# Patient Record
(demographics unavailable — no encounter records)

---

## 2024-10-14 NOTE — PHYSICAL EXAM
[TextEntry] : Constitutional: Patient is well nourished, well appearing and in no distress Pulmonary: No respiratory distress Neuro: Speech normal, alert and oriented No LE edema Psychiatric: Normal mood, normal insight/judgement, normal affect Repeat blood pressure is 145/90 I reviewed his emergency room records including the physician note, CBC, CMP and lower extremity ultrasound

## 2024-10-14 NOTE — HISTORY OF PRESENT ILLNESS
[FreeTextEntry1] : Patient is here for follow-up of diabetes, lower extremity edema [de-identified] : Patient visited the emergency room with bilateral lower extremity edema 1 week ago.  There is no evidence of DVT and he was released.  Since then he has been using elastic stockings and the edema has improved. In terms of his diabetes he has become more faithful in taking his regular insulin with his meals.  Looking at his freestyle enrique results for the last 7 days his fasting blood sugar is averaging about 150 and his average blood sugar looks to be around 200 which is improved from the week before.  Patient states that he tried taking Ozempic last week but it caused significant pain at the site of the injection and a bad taste in his mouth.  He says he will not be taking Ozempic again.  He stopped his metformin about 4 weeks ago because he read reports on it causing problems.  He is currently taking long-acting insulin 26 units in the morning and 26 units at night along with short acting insulin 5 units before each meal.

## 2024-10-14 NOTE — ASSESSMENT
[FreeTextEntry1] : . #Diabetes mellitus not controlled: Will increase his evening Basaglar to 28 units and continue 26 units in the morning.  Continue 5 units of short acting insulin with meals.  Patient has missed 2 endocrinology appointments but has one for the end of this month and I explained how important it is that he see the endocrinologist.  He apparently is intolerant to Ozempic and does not wish to take metformin.  I will obtain urinalysis and albumin to creatinine ratio  #Lower extremity edema: Improved with elastic stockings.  Most likely related to venous insufficiency  #Blood pressure elevation: Patient's blood pressure is elevated today however his previous blood pressures have been normal.  Will check his blood pressure again in 1 month.

## 2024-10-29 NOTE — DATA REVIEWED
[FreeTextEntry1] : 9/17/24 A1c 10.0%,   6/14/24 serum creatinine 0.78, BUN 16,  LDL 72, HDL 48, total-c 143, tg 133 TSH 1.63

## 2024-10-29 NOTE — HISTORY OF PRESENT ILLNESS
[FreeTextEntry1] : Bernardo Velarde is a 60 year old male who presents to establish care for Diabetes Mellitus Type 2. Referred by: Dr. Salomón Rooney  Previously seen by Dr. Smith in April 2024   PMHx: Obesity, DM, HTN, HLD, venous insufficiency, Vitamin D deficiency, asthma PSHx: left foot bone pin/screw, abdominal hernia repair, rotator cuff surgery, cervical spine surgery, brain aneurysm repair FMHx: DM, HTN (mother, father), prostate cancer (father) NKDA   Diagnosed with Diabetes in [id number] -- was living in [place], was hospitalized for abdominal surgery and during procedure was told he had DM Currently endorses symptoms of hyperglycemia including polyuria, polydipsia, blurry vision Denies polyphagia  Describes making many dietary changes recently but glucose still above goal. He is working towards improved glucose control to have spinal surgery w/Dr. Porter Has cut out white rice, bread, fried foods, pork, beef, potato chips, cookies Only proteins are chicken, fish Says he "doesn't know what moderation means" so now he doesn't buy things he knows he shouldn't eat This Am - , had a little container of milk went to 220   POCT A1c in office: 9.3% FS in office: 206 -- ~2hrs after ~8oz milk at 8:30AM   Current Regimen: -- Basaglar 26 units in AM, 28 units in PM -- Humalog 5 units with meals  -- Jardiance 25mg daily  Previous regimens: -- Ozempic (had LLQ abdominal pain/reflux symptoms which have persisted since d/c of medication)  -- Metformin (no longer wanted to take d/t his concerns about side effects)   Bernardo tests blood glucose using Freestyle [employer] 3, reviewed recent data on his phone tanja. Date of report download: 10/16/2024 - 10/29/2024 % time with CGM in use: 96%   Time high: 56% (time BG >251: 14%) Time in range: 44% Time low: 0% (time BG <54: 0%)   GMI: 7.9% Average glucose: 193 mg/dL   CGM analysis reveals: Glucose generally increasing over the course of the day, indicating incomplete response to prandial dose of Humalog 5 units w/meals. Obvious decline in glucose level while overnight fasting.   - Diet: Drinks a lot of milk, up to 3-4 gallons every 2 weeks Skips breakfast or has an ensure Lunch - usually veggie wrap or coconut brown rice with tunafish Dinner - may be similar to lunch  Sometimes has diet soda - Exercise: Minimal because of the back pain   Complications + neuropathy, retinopathy: No known albuminuria Denies CVD events - MI/stroke   Ophthalmologist: +DR (has laser eye surgery tomorrow for swelling behind eye and floaters) Podiatrist: Saw a few years ago   Social Hx: No alcohol Denies nicotine Uses medical marijuana, no other drug use work -- on disability now s/p neck surgery Lives alone with dog, Pete Children - none  Current medications: Jardiance, Humalog, Basaglar, atorvastatin (he does not recall names of other medications)

## 2024-10-29 NOTE — REVIEW OF SYSTEMS
[Recent Weight Gain (___ Lbs)] : recent weight gain: [unfilled] lbs [Blurred Vision] : blurred vision [Abdominal Pain] : abdominal pain [Polyuria] : polyuria [Nocturia] : nocturia [Back Pain] : back pain [Difficulty Walking] : difficulty walking [Polydipsia] : polydipsia [Negative] : Psychiatric [FreeTextEntry3] : Floaters [FreeTextEntry7] : + reflux

## 2024-10-29 NOTE — ASSESSMENT
[FreeTextEntry1] : Type 2 DM, not well controlled, complications including retinopathy (planned laser eye surgery tomorrow), neuropathy  Current regimen: Basaglar 26 units in AM, 28 units in PM, Jardiance 25mg daily, Humalog 5 units with meals  Home blood glucose monitoring reveals glucose generally increasing over the course of the day, indicating incomplete response to prandial dose of Humalog 5 units w/meals. Obvious decline in glucose level while overnight fasting. He would benefit from GLP-1 as he has increased prandial needs, however he still has some residual symptoms after stopping Ozempic. We discussed starting Mounjaro, may initate at next visit.   Goal A1c <7%, A1c 9.3% Goal BP <130/80, BP today 108/67 Goal LDL <100mg/dL, recent LDL 72 (June 2024), on Atorvastatin 40mg daily  Plan -- Decrease Basaglar to 24 units BID -- Increase Humalog to 7 units with meals -- Continue Jardiance 25mg daily -- Continue CGM monitoring with [employer] 3 - will send Heidi invitation to email  -- Call office with significant hypo- or hyperglycemia with above recommendations -- Increase physical activity as tolerated, even if it means range of motion exercises -- Continue dietary modifications and healthy diet, consider decreasing milk intake by about half. -- Follow up in 6 weeks

## 2024-10-29 NOTE — PHYSICAL EXAM
[Alert] : alert [No Acute Distress] : no acute distress [EOMI] : extra ocular movement intact [Normal Hearing] : hearing was normal [Thyroid Not Enlarged] : the thyroid was not enlarged [No Thyroid Nodules] : no palpable thyroid nodules [No Respiratory Distress] : no respiratory distress [No Accessory Muscle Use] : no accessory muscle use [Normal Rate and Effort] : normal respiratory rate and effort [Clear to Auscultation] : lungs were clear to auscultation bilaterally [Normal S1, S2] : normal S1 and S2 [Normal Rate] : heart rate was normal [No Edema] : no peripheral edema [Pedal Pulses Normal] : the pedal pulses are present [Normal Bowel Sounds] : normal bowel sounds [Not Tender] : non-tender [Not Distended] : not distended [Soft] : abdomen soft [Kyphosis] : no kyphosis present [No Stigmata of Cushings Syndrome] : no stigmata of Cushings Syndrome [Abdominal Striae] : no abdominal striae [Acne] : no acne [Oriented x3] : oriented to person, place, and time [Normal Insight/Judgement] : insight and judgment were intact [de-identified] : Some bruising on abdomen, mild lipodystrophy in injection sites (pt states he's recently started rotating) [de-identified] : Altered gait, uses cane

## 2024-11-18 NOTE — HISTORY OF PRESENT ILLNESS
[FreeTextEntry1] : Patient is here for follow-up of diabetes, hypertension, hyperlipidemia [de-identified] : Patient is seeing an endocrinologist who adjusted his insulin regimen.  Unfortunately patient does not have the sensor for his continuous glucose monitor so we have no readings today.  Patient also is uncertain about the other medications that he is taking but he knows he is no longer taking metformin.  He believes he is taking Jardiance.  I asked him to bring his medications in at next visit so we can review them all. Patient states he was in the emergency room recently because he had difficulty walking.  He was treated and released with no specific therapy.  Patient states he seen a pain specialist in the past but is not willing to see 1 in the future. Patient refuses all vaccines and colorectal screening.

## 2024-11-18 NOTE — ASSESSMENT
[FreeTextEntry1] : . #Insulin requiring diabetes: Will continue regimen recommended by endocrinologist.  Patient is strongly advised to keep his next appointment  #Hyperlipidemia: Patient is unsure of the medications that he is taking at home.  Continue atorvastatin 40 mg daily  #Back pain: Patient unwilling to see pain specialist at this time.  I have advised him of the potential benefits but he still refuses  #Health maintenance: Patient is refusing all vaccinations and colorectal screening.  He had a normal PSA in September 2024.  #Hypertension: on no meds, at goal today and even lower at recent physician visits. Will not prescribe meds.  Will see patient in 2 months and he is advised to bring all his medications with him at that time.

## 2024-11-18 NOTE — PHYSICAL EXAM
[TextEntry] : Constitutional: Patient is well nourished, well appearing and in no distress Pulmonary: No respiratory distress Neuro: Speech normal, alert and oriented Psychiatric: Normal mood, normal insight/judgement, normal affect  Blood pressure is elevated upon arrival.  Patient believes that is because he just used a vaping device just prior to arrival.  Repeat blood pressure is a bit better.

## 2025-01-02 NOTE — ASSESSMENT
[FreeTextEntry1] : Type 2 DM, not well controlled, complications including retinopathy, neuropathy  Current regimen: Basaglar 30 units in AM, 30 units in PM, Jardiance 25mg daily, Humalog 10 units with meals  Home blood glucose monitoring reveals spike around 1AM (may have iced tea or milk at this time) followed by generally declining glucose until about 8AM at which point glucose tends to climb throughout day, reflecting extended/accumulating postprandial excursions. As Bernardo often has difficulty remembering to take Humalog w/meals (and notes that he only takes Humalog ~1 or 2x/day, we discussed initiating GLP to help with postprandial excursions. Notes he had some abdominal discomfort with Ozempic and has some LLQ pain (which he acknowledges may be r/t back pain radiating or possible hernia), but is open to a trial of Mounjaro.    Goal A1c <7%, A1c 9.2% Goal BP <130/80, BP today 153/88, taking metoprolol daily Goal LDL <100mg/dL, recent LDL 72 (June 2024), on Atorvastatin 40mg daily  Plan -- Start Mounjaro 2.5mg weekly -- Decrease Basaglar to 28 units BID -- Continue Humalog 10 units with meals but if 2hr PPBS is 120 or less after starting Mounjaro, decrease Humalog dose to 8 units with meals -- Continue Jardiance 25mg daily -- Continue CGM monitoring with [email or web address] Sloane 3, bring cell phone to next appt -- Call office with significant hypo- or hyperglycemia with above recommendations -- Follow up with PCP regarding GI concerns -- Increase physical activity as tolerated -- podiatry referral -- Follow up in 8 weeks with me, in 5 months with MD

## 2025-01-02 NOTE — REVIEW OF SYSTEMS
[Recent Weight Gain (___ Lbs)] : recent weight gain: [unfilled] lbs [Abdominal Pain] : abdominal pain [Back Pain] : back pain [Negative] : Endocrine [Nausea] : no nausea [Constipation] : no constipation [Vomiting] : no vomiting [FreeTextEntry7] : reports LLQ abdominal pain radiating to groin

## 2025-01-02 NOTE — ASSESSMENT
[FreeTextEntry1] : Type 2 DM, not well controlled, complications including retinopathy, neuropathy  Current regimen: Basaglar 30 units in AM, 30 units in PM, Jardiance 25mg daily, Humalog 10 units with meals  Home blood glucose monitoring reveals spike around 1AM (may have iced tea or milk at this time) followed by generally declining glucose until about 8AM at which point glucose tends to climb throughout day, reflecting extended/accumulating postprandial excursions. As Bernardo often has difficulty remembering to take Humalog w/meals (and notes that he only takes Humalog ~1 or 2x/day, we discussed initiating GLP to help with postprandial excursions. Notes he had some abdominal discomfort with Ozempic and has some LLQ pain (which he acknowledges may be r/t back pain radiating or possible hernia), but is open to a trial of Mounjaro.    Goal A1c <7%, A1c 9.2% Goal BP <130/80, BP today 153/88, taking metoprolol daily Goal LDL <100mg/dL, recent LDL 72 (June 2024), on Atorvastatin 40mg daily  Plan -- Start Mounjaro 2.5mg weekly -- Decrease Basaglar to 28 units BID -- Continue Humalog 10 units with meals but if 2hr PPBS is 120 or less after starting Mounjaro, decrease Humalog dose to 8 units with meals -- Continue Jardiance 25mg daily -- Continue CGM monitoring with [employer] [facility] 3, bring cell phone to next appt -- Call office with significant hypo- or hyperglycemia with above recommendations -- Follow up with PCP regarding GI concerns -- Increase physical activity as tolerated -- podiatry referral -- Follow up in 8 weeks with me, in 5 months with MD

## 2025-01-02 NOTE — PHYSICAL EXAM
[Alert] : alert [No Acute Distress] : no acute distress [EOMI] : extra ocular movement intact [Normal Hearing] : hearing was normal [No Respiratory Distress] : no respiratory distress [No Accessory Muscle Use] : no accessory muscle use [Normal Rate and Effort] : normal respiratory rate and effort [Clear to Auscultation] : lungs were clear to auscultation bilaterally [Normal S1, S2] : normal S1 and S2 [Normal Rate] : heart rate was normal [No Edema] : no peripheral edema [Pedal Pulses Normal] : the pedal pulses are present [Not Distended] : not distended [Spine Straight] : spine straight [No Stigmata of Cushings Syndrome] : no stigmata of Cushings Syndrome [Normal Gait] : normal gait [No Joint Swelling] : no joint swelling seen [Oriented x3] : oriented to person, place, and time [Normal Insight/Judgement] : insight and judgment were intact [Kyphosis] : no kyphosis present [Abdominal Striae] : no abdominal striae [de-identified] : mild lipodystrophy on right side of abdomen. Otherwise abdomen soft, not distended, though pt notes mild discomfort with palpation of LLQ

## 2025-01-02 NOTE — HISTORY OF PRESENT ILLNESS
[FreeTextEntry1] : Bernardo Velarde is a 60 year old male who presents to establish care for Diabetes Mellitus Type 2. DM diagnosed around 2005 Complications: neuropathy, retinopathy  PMHx: Obesity, DM, HTN, HLD, venous insufficiency, Vitamin D deficiency, asthma PSHx: left foot bone pin/screw, abdominal hernia repair, rotator cuff surgery, cervical spine surgery, brain aneurysm repair FMHx: DM, HTN (mother, father), prostate cancer (father) NKDA  At initial visit, had polyuria, polydipsia -- this continues Has been working towards improved glucose control to have spinal surgery w/Dr. Porter -- still has back pain Was drinking a lot of milk -- has not really decreased intake of milk but now drinking less soda Thinks his DM has been less well controlled lately d/t everything he ate in the holidays -- gained a little weight Had swelling of RLE -- seen in ER at [facility], had US and was told no blood clot. Reports his gabapentin dose was increased and swelling has improved.  Sometimes forgets to take the Humalog before eating because he gets cranky and hungry, takes it after   POCT A1c in office: 9.2% from 9.3% in late October FS in office: 113 is 3hrs PPBS -- ate turkey colmenares and eggs on a roll at 8AM and took Humalog 10 units   Current Regimen: -- Basaglar 30 units in AM, 30 units in PM -- Humalog 10 units with meals  -- Jardiance 25mg daily  Previous regimens: -- Ozempic (had LLQ abdominal pain/reflux symptoms which have persisted since d/c of medication)  -- Metformin (no longer wanted to take d/t his concerns about side effects)   Bernardo tests blood glucose using Freestyle [place] 3, reviewed recent data on his phone tanja. Date of report download: 12/10/24 % time with CGM in use: 78%   Time high: 74% (time BG >251: 39%) Time in range: 26% Time low: 0% (time BG <54: 0%)   GMI: 8.9% Average glucose: 233mg/dL   CGM analysis reveals: spike around 1AM (may have iced tea or milk at this time) followed by generally declining glucose until about 8AM at which point glucose tends to climb throughout day, reflecting extended/accumulating postprandial excursions   - Diet: Drinks a lot of milk, up to 3-4 gallons every 2 weeks Skips breakfast or has an ensure Lunch - usually veggie wrap or coconut brown rice with tunafish Dinner - may be similar to lunch  Sometimes has diet soda - Exercise: Minimal because of the back pain   Ophthalmologist: Nov 2024 -- has , receiving laser treatments  Podiatrist: Saw a few years ago   Social Hx: Uses medical marijuana, no other drug use work -- on disability now s/p neck surgery Lives alone with Pete obrien  Current medications: Jardiance, Humalog, Basaglar, atorvastatin (he does not recall names of other medications)

## 2025-01-02 NOTE — HISTORY OF PRESENT ILLNESS
[FreeTextEntry1] : Bernardo Velarde is a 60 year old male who presents to establish care for Diabetes Mellitus Type 2. DM diagnosed around 2005 Complications: neuropathy, retinopathy  PMHx: Obesity, DM, HTN, HLD, venous insufficiency, Vitamin D deficiency, asthma PSHx: left foot bone pin/screw, abdominal hernia repair, rotator cuff surgery, cervical spine surgery, brain aneurysm repair FMHx: DM, HTN (mother, father), prostate cancer (father) NKDA  At initial visit, had polyuria, polydipsia -- this continues Has been working towards improved glucose control to have spinal surgery w/Dr. Porter -- still has back pain Was drinking a lot of milk -- has not really decreased intake of milk but now drinking less soda Thinks his DM has been less well controlled lately d/t everything he ate in the holidays -- gained a little weight Had swelling of RLE -- seen in ER at [facility], had US and was told no blood clot. Reports his gabapentin dose was increased and swelling has improved.  Sometimes forgets to take the Humalog before eating because he gets cranky and hungry, takes it after   POCT A1c in office: 9.2% from 9.3% in late October FS in office: 113 is 3hrs PPBS -- ate turkey colmenares and eggs on a roll at 8AM and took Humalog 10 units   Current Regimen: -- Basaglar 30 units in AM, 30 units in PM -- Humalog 10 units with meals  -- Jardiance 25mg daily  Previous regimens: -- Ozempic (had LLQ abdominal pain/reflux symptoms which have persisted since d/c of medication)  -- Metformin (no longer wanted to take d/t his concerns about side effects)   Bernardo tests blood glucose using Freestyle [place] 3, reviewed recent data on his phone tanja. Date of report download: 12/10/24 % time with CGM in use: 78%   Time high: 74% (time BG >251: 39%) Time in range: 26% Time low: 0% (time BG <54: 0%)   GMI: 8.9% Average glucose: 233mg/dL   CGM analysis reveals: spike around 1AM (may have iced tea or milk at this time) followed by generally declining glucose until about 8AM at which point glucose tends to climb throughout day, reflecting extended/accumulating postprandial excursions   - Diet: Drinks a lot of milk, up to 3-4 gallons every 2 weeks Skips breakfast or has an ensure Lunch - usually veggie wrap or coconut brown rice with tunafish Dinner - may be similar to lunch  Sometimes has diet soda - Exercise: Minimal because of the back pain   Ophthalmologist: Nov 2024 -- has , receiving laser treatments  Podiatrist: Saw a few years ago   Social Hx: Uses medical marijuana, no other drug use work -- on disability now s/p neck surgery Lives alone with Pete obrien  Current medications: Jardiance, Humalog, Basaglar, atorvastatin (he does not recall names of other medications)

## 2025-01-02 NOTE — PHYSICAL EXAM
[Alert] : alert [No Acute Distress] : no acute distress [EOMI] : extra ocular movement intact [Normal Hearing] : hearing was normal [No Respiratory Distress] : no respiratory distress [No Accessory Muscle Use] : no accessory muscle use [Normal Rate and Effort] : normal respiratory rate and effort [Clear to Auscultation] : lungs were clear to auscultation bilaterally [Normal S1, S2] : normal S1 and S2 [Normal Rate] : heart rate was normal [No Edema] : no peripheral edema [Pedal Pulses Normal] : the pedal pulses are present [Not Distended] : not distended [Spine Straight] : spine straight [No Stigmata of Cushings Syndrome] : no stigmata of Cushings Syndrome [Normal Gait] : normal gait [No Joint Swelling] : no joint swelling seen [Oriented x3] : oriented to person, place, and time [Normal Insight/Judgement] : insight and judgment were intact [Kyphosis] : no kyphosis present [Abdominal Striae] : no abdominal striae [de-identified] : mild lipodystrophy on right side of abdomen. Otherwise abdomen soft, not distended, though pt notes mild discomfort with palpation of LLQ

## 2025-01-06 NOTE — HISTORY OF PRESENT ILLNESS
[FreeTextEntry1] : Patient is here for follow-up of multiple medical conditions. [de-identified] : Patient states he was recently admitted to the hospital at [facility] for persistent lower extremity discomfort.  He states that his gabapentin was increased to 400 mg 3 times daily. He has seen the endocrinologist to began Mounjaro and adjusted his long-acting insulin to 28 units twice a day and short acting insulin 10 units AC.  He has not yet started the Mounjaro. His blood pressure had been noted to be elevated at several recent visits.  Other discharge summary from the hospital, it appears the patient was prescribed lisinopril 10 mg daily but he is not certain if he is taking it.  In fact, patient is uncertain as to what medications he is taking. Patient shared his freestyle enrique tanja information which shows his blood sugars run between 180 and 250 consistently. Patient states he continues to have back pain and is frustrated that he cannot have surgery until his diabetes is under control.  He is requesting a TENS unit

## 2025-01-06 NOTE — PHYSICAL EXAM
[Soft] : abdomen soft [Non Tender] : non-tender [de-identified] : There is no evidence of a ventral or inguinal hernia

## 2025-01-06 NOTE — DATA REVIEWED
[FreeTextEntry1] : I reviewed the discharge summary from [facility], endocrinology notes and hemoglobin A1c obtained by the endocrinologist.

## 2025-01-06 NOTE — ASSESSMENT
[FreeTextEntry1] : . #Diabetes out-of-control: This no doubt accounts for patient's discomfort in his legs.  Patient is encouraged to begin the Mounjaro and stay on the same regimen prescribed by the endocrinologist  #Hypertension: Systolic blood pressure is 139 in the office today.  It is unclear if the patient is on lisinopril.  I will have the nurse call the patient while he is at home to do a complete medication reconciliation and we can adjust the medications at that time.  #Poor health literacy: See above  #Chronic back pain: Patient is requesting a TENS unit.  Will refer him to physiatry.  F/U 4-6 weeks

## 2025-02-03 NOTE — PHYSICAL EXAM
[TextEntry] : Constitutional: Patient is well nourished, well appearing and in no distress Pulmonary: No respiratory distress Neuro: Speech normal, alert and oriented Psychiatric: Normal mood, normal insight/judgement, normal affect Examination of the right foot reveals a very tender area between the first and second metatarsals on the plantar surface of his foot.  There is no skin breakdown or ulcers.

## 2025-02-03 NOTE — HISTORY OF PRESENT ILLNESS
[FreeTextEntry1] : Patient is here to follow-up for elevated blood pressure and diabetes [de-identified] : Patient has been seen by his endocrinologist and Mounjaro 2.5 mg weekly has been prescribed.  Patient has been taking it without difficulty except he missed his last dose.  I reviewed the blood sugars on his freestyle enrique and they seem to average between 150 and 250 without much improvement over the last 3 months.  Patient states he sometimes forgets to take his insulin as well. Patient's main complaint is pain in his right foot.

## 2025-03-06 NOTE — ASSESSMENT
[FreeTextEntry1] : Type 2 DM, not well controlled, complications including retinopathy, neuropathy  Current regimen: Basaglar 28 units BID, Jardiance 25mg daily, Humalog 10 units with meals, Mounjaro 2.5mg weekly  Home blood glucose monitoring reveals spike around 1AM (may have iced tea or milk at this time) followed by generally declining glucose until about 8AM at which point glucose tends to climb throughout day, reflecting extended/accumulating postprandial excursions. As Bernardo often has difficulty remembering to take Humalog w/meals (and notes that he only takes Humalog ~1 or 2x/day, we discussed initiating GLP to help with postprandial excursions. Notes he had some abdominal discomfort with Ozempic and has some LLQ pain (which he acknowledges may be r/t back pain radiating or possible hernia), but is open to a trial of Mounjaro.    Goal A1c <7%, A1c ** Goal BP <130/80, BP today *** taking metoprolol daily Goal LDL <100mg/dL, recent LDL 72 (June 2024), on Atorvastatin 40mg daily  Plan --  Mounjaro 2.5mg weekly -- Basaglar to 28 units BID -- Continue Humalog 10 units with meals but if 2hr PPBS is 120 or less after starting Mounjaro, decrease Humalog dose to 8 units with meals -- Continue Jardiance 25mg daily -- Continue CGM monitoring with Freestyle Sloane 3 -- indicated for MDI insulin use -- Increase physical activity as tolerated -- Follow up in 3 months with Dr. Smith as planned

## 2025-03-06 NOTE — HISTORY OF PRESENT ILLNESS
[FreeTextEntry1] : Bernardo Velarde is a 60 year old male who presents to establish care for Diabetes Mellitus Type 2. DM diagnosed around 2005 Complications: neuropathy, retinopathy  PMHx: Obesity, DM, HTN, HLD, venous insufficiency, Vitamin D deficiency, asthma PSHx: left foot bone pin/screw, abdominal hernia repair, rotator cuff surgery, cervical spine surgery, brain aneurysm repair FMHx: DM, HTN (mother, father), prostate cancer (father) NKDA  Tolerating mounjaro? polyuria, polydipsia? Has been working towards improved glucose control to have spinal surgery w/Dr. Porter -- still has back pain Was drinking a lot of milk  Had decreased soda intake   Taking Humalog before meal?  POCT A1c in office:  FS in office:    Current Regimen: -- Mounjaro 2.5mg weekly -- Basaglar 28 units in AM, 28 units in PM -- Humalog 10 units with meals  -- Jardiance 25mg daily   Bernardo tests blood glucose using Freestyle Slo[place] 3, reviewed recent data on his phone tanja. Date of report download:  % time with CGM in use:    Time high:  (time BG >251: ) Time in range: Time low: 0% (time BG <54: 0%)   GMI:  Average glucose:    CGM analysis reveals:   - Diet: Drinks a lot of milk, up to 3-4 gallons every 2 weeks Skips breakfast or has an ensure Lunch - usually veggie wrap or coconut brown rice with tunafish Dinner - may be similar to lunch  Sometimes has diet soda - Exercise: Minimal because of the back pain   Ophthalmologist: Nov 2024 -- has , receiving laser treatments  Podiatrist: Saw a few years ago, referred at last visit   Social Hx: Uses medical marijuana, no other drug use work -- on disability now s/p neck surgery Lives alone with Pete obrien  Current medications: Jardiance, Humalog, Basaglar, atorvastatin, Mounjaro 2.5mg weekly (he does not recall names of other medications)

## 2025-03-06 NOTE — DATA REVIEWED
[FreeTextEntry1] : 9/17/24 A1c 10.0%,   6/14/24 serum creatinine 0.78, BUN 16,  LDL 72, HDL 48, total-c 143, tg 133 TSH 1.63 15

## 2025-04-07 NOTE — PHYSICAL EXAM
[de-identified] : There is a less than 1 cm nodule on the sole of the patient's foot just below the first MP joint.  Is nontender [TextEntry] : Constitutional: Patient is well nourished, well appearing and in no distress Pulmonary: No respiratory distress Neuro: Speech normal, alert and oriented Psychiatric: Normal mood, normal insight/judgement, normal affect

## 2025-04-07 NOTE — HISTORY OF PRESENT ILLNESS
[FreeTextEntry1] : Patient is here for follow-up of diabetes [de-identified] : Patient was started on Mounjaro 2.5 mg by the endocrinologist.  He is lost 10 pounds since.  Other than malodorous burps, patient has no other concerns with Mounjaro.  He shared his freestyle enrique results which only has 2 days of results.  He was only at goal 22% of the time.  Patient often still forgets to take his regular insulin with meals.  He had 1 episode of postprandial hypoglycemia because of poor timing. Patient is opposed to all vaccinations. Patient complains of lump on sole of right foot. He saw a podiatrist but was not satisfied.

## 2025-05-05 NOTE — PHYSICAL EXAM
[TextEntry] : Repeat blood pressure using automated device much improved Constitutional: Patient is well nourished, well appearing and in no distress Pulmonary: No respiratory distress Neuro: Speech normal, alert and oriented Psychiatric: Normal mood, normal insight/judgement, normal affect

## 2025-05-05 NOTE — HISTORY OF PRESENT ILLNESS
[FreeTextEntry1] : Patient is here for follow-up of diabetes [de-identified] : Patient states he tried to be compliant with diet but at times goes off diet.  He had a very sweet meal and that spiked his blood sugar to over 300.  According to patient's freestyle enrique, he is above 250 about 25% of the time.  He is in range about 40% of the time There are no episodes of hypoglycemia. He has not had problems with Mounjaro except for occasional ammonia [place].

## 2025-06-09 NOTE — DATA REVIEWED
[FreeTextEntry1] : I reviewed the patient's freestyle enrique and he is in range only 25% of the time.  He is above 250 mg/dL 25% of the time

## 2025-06-09 NOTE — HISTORY OF PRESENT ILLNESS
[FreeTextEntry1] : Patient is here for follow-up of diabetes out-of-control [de-identified] : Patient forgot to take his glargine insulin this morning.  He states he periodically forgets to take his prandial insulin as well.

## 2025-07-24 NOTE — HISTORY OF PRESENT ILLNESS
[de-identified] : Returns today regarding chronic low back and left lower extremity radicular pain.  Has been working on lowering his hemoglobin A1c with dietary modification

## 2025-07-24 NOTE — ASSESSMENT
[FreeTextEntry1] : 59-year-old male with chronic left-sided L4 radiculopathy with quadriceps weakness lateral left L4-5 disc herniation L2-3 retrolisthesis

## 2025-07-24 NOTE — DISCUSSION/SUMMARY
[de-identified] : We discussed the multiple potential causes of his ongoing pain.  Given his ongoing symptoms despite regular physical therapy she has been performing over the last 3 months several times per week have recommended updated MRI and CAT scan of the lumbar spine for surgical planning.  We discussed the risk associated with spinal surgery in setting of elevated A1c and he will continue to work on bringing this down.  Will review the updated imaging once complete  I have spent 40 minutes on the encounter which excludes teaching and separately reported services.

## 2025-07-24 NOTE — PHYSICAL EXAM
[UE/LE] : Sensory: Intact in bilateral upper & lower extremities [Normal DTR Reflexes] : DTR reflexes normal [Normal Touch] : sensation intact for touch [Normal Proprioception] : sensation intact for proprioception [Normal] : No costovertebral angle tenderness and no spinal tenderness [de-identified] : Left quadriceps 4+ out of 5 with some atrophy noted [de-identified] : MRI lumbar spine 11/13/2023 care extreme: L3-4 there is some disc base generation 3 mild left lateral recess stenosis.  L4-5 there is a far lateral disc bulge effacing the exiting L4 nerve.  L5-S1 disc base heights maintained with no stenosis or significant degenerative change.  MRI lumbar spine 7/24/2025 Retrolisthesis at L3-4 with spondylosis at multiple levels